# Patient Record
Sex: MALE | Race: WHITE | NOT HISPANIC OR LATINO | Employment: FULL TIME | URBAN - METROPOLITAN AREA
[De-identification: names, ages, dates, MRNs, and addresses within clinical notes are randomized per-mention and may not be internally consistent; named-entity substitution may affect disease eponyms.]

---

## 2017-08-31 ENCOUNTER — HOSPITAL ENCOUNTER (EMERGENCY)
Facility: OTHER | Age: 22
Discharge: HOME | End: 2017-08-31
Attending: INTERNAL MEDICINE
Payer: COMMERCIAL

## 2017-08-31 VITALS
TEMPERATURE: 98 F | OXYGEN SATURATION: 98 % | HEART RATE: 72 BPM | RESPIRATION RATE: 16 BRPM | SYSTOLIC BLOOD PRESSURE: 114 MMHG | WEIGHT: 140 LBS | DIASTOLIC BLOOD PRESSURE: 71 MMHG

## 2017-08-31 DIAGNOSIS — L03.113 CELLULITIS OF RIGHT HAND: Primary | ICD-10-CM

## 2017-08-31 PROCEDURE — 99283 EMERGENCY DEPT VISIT LOW MDM: CPT

## 2017-08-31 RX ORDER — SULFAMETHOXAZOLE AND TRIMETHOPRIM 800; 160 MG/1; MG/1
1 TABLET ORAL 2 TIMES DAILY
Qty: 20 TABLET | Refills: 0 | Status: SHIPPED | OUTPATIENT
Start: 2017-08-31 | End: 2017-09-10

## 2017-09-01 NOTE — ED PROVIDER NOTES
Encounter Date: 8/31/2017       History     Chief Complaint   Patient presents with    Abscess     abscess to top of rt hand, states he noted the swelling a day ago, now the area is very painful     22-year-old male presents to the emergency department with right hand redness, tenderness and swelling ×2 days.  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      The history is provided by the patient. No  was used.   Rash    This is a new problem. The current episode started yesterday. The problem has been unchanged. The problem is associated with an unknown factor. The rash is present on the right hand. The pain is at a severity of 4/10. The pain has been constant since onset. Associated symptoms include pain. Pertinent negatives include no blisters, no itching and no weeping. He has tried nothing for the symptoms.     Review of patient's allergies indicates:  No Known Allergies  History reviewed. No pertinent past medical history.  History reviewed. No pertinent surgical history.  History reviewed. No pertinent family history.  Social History   Substance Use Topics    Smoking status: Current Every Day Smoker    Smokeless tobacco: Never Used    Alcohol use Yes      Comment: occassionally     Review of Systems   Skin: Positive for rash (Infected lesion). Negative for itching.   All other systems reviewed and are negative.      Physical Exam     Initial Vitals [08/31/17 2053]   BP Pulse Resp Temp SpO2   114/71 72 16 97.7 °F (36.5 °C) 98 %      MAP       85.33         Physical Exam    Nursing note and vitals reviewed.  Constitutional: He appears well-developed and well-nourished. No distress.   HENT:   Head: Normocephalic.   Eyes: EOM are normal.   Neck: Normal range of motion.   Cardiovascular: Normal rate and regular rhythm.   Pulmonary/Chest: Breath sounds normal. No respiratory distress.   Abdominal: He exhibits no distension.   Musculoskeletal: Normal  range of motion.   Neurological: He is alert.   Skin: Skin is warm.   Right dorsal hand with an area of erythema, tenderness and induration of approximately 3 cm diameter.  The area is nonfluctuant and has a central open pustule   Psychiatric: He has a normal mood and affect.         ED Course   Procedures  Labs Reviewed - No data to display          Medical Decision Making:   Initial Assessment:   22-year-old male presents to the emergency department with right hand redness, tenderness and swelling ×2 days.  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    Differential Diagnosis:   ) Cellulitis  ) Abscess    ED Management:  Patient is given instructions for his right hand cellulitis and a prescription for Bactrim and Bactroban intranasal ointment.  He was advised to follow-up with his primary care physician tomorrow for reevaluation.  He was given his first dose of Bactrim tonight.                   ED Course      Clinical Impression:   The encounter diagnosis was Cellulitis of right hand.    Disposition:   Disposition: Discharged  Condition: Stable                        Karan Epperson MD  09/08/17 0322

## 2017-09-01 NOTE — ED TRIAGE NOTES
Patient has abscess to top of right hand.  He states it has been there for 2-3 days.  He states he squeezed some clear, bloody liquid out of the site.  He denies any fever.  States he has had staph infection 2 months ago.       LOC: The patient is awake and alert; oriented x 3 and speaking appropriately.  APPEARANCE: Patient resting comfortably, patient is clean and well groomed  SKIN: warm and dry, normal skin turgor & moist mucus membranes.  Broken skin noted to right hand with redness.  MUSCULOSKELETAL: Patient moving all extremities well.  Swelling noted to top of right hand.  RESPIRATORY: Airway is open and patent, breath sounds clear throughout all lung fields; respirations are spontaneous, normal effort and rate  CARDIAC: Patient has a normal rate, no peripheral edema noted, capillary refill < 3 seconds; No complaints of chest pain   ABDOMEN: Soft and non tender to palpation, no distention noted. Bowel sounds present x 4